# Patient Record
Sex: MALE | Race: WHITE
[De-identification: names, ages, dates, MRNs, and addresses within clinical notes are randomized per-mention and may not be internally consistent; named-entity substitution may affect disease eponyms.]

---

## 2018-06-20 ENCOUNTER — HOSPITAL ENCOUNTER (EMERGENCY)
Dept: HOSPITAL 47 - EC | Age: 58
Discharge: HOME | End: 2018-06-20
Payer: SELF-PAY

## 2018-06-20 VITALS — DIASTOLIC BLOOD PRESSURE: 67 MMHG | HEART RATE: 60 BPM | SYSTOLIC BLOOD PRESSURE: 116 MMHG

## 2018-06-20 VITALS — RESPIRATION RATE: 18 BRPM | TEMPERATURE: 98 F

## 2018-06-20 DIAGNOSIS — Z88.5: ICD-10-CM
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
## 2018-06-20 DIAGNOSIS — S43.004A: Primary | ICD-10-CM

## 2018-06-20 PROCEDURE — 99283 EMERGENCY DEPT VISIT LOW MDM: CPT

## 2018-06-20 PROCEDURE — 99152 MOD SED SAME PHYS/QHP 5/>YRS: CPT

## 2018-06-20 PROCEDURE — 23650 CLTX SHO DSLC W/MNPJ WO ANES: CPT

## 2018-06-20 NOTE — ED
General Adult HPI





- General


Source: patient, RN notes reviewed


Mode of arrival: wheelchair


Limitations: no limitations





<Brown Cox - Last Filed: 06/20/18 18:37>





<Milton Hicks - Last Filed: 06/20/18 19:17>





- General


Chief complaint: Extremity Injury, Upper


Stated complaint: Shoulder Pain


Time Seen by Provider: 06/20/18 16:52





- History of Present Illness


Initial comments: 





Patient 57-year-old male presents emergency room today with a chief complaint 

of fall that occurred approximately an hour ago.  Patient does admit that he 

was on a roof all spelled fell down onto a platform.  States that shoulder.  

States he believes his right shoulder is dislocated.  Patient states has pain 

with any type of movement of the shoulder area.  He denies any head injury lost 

consciousness.  States is not on any blood thinners.  He denies any other 

complaints. Patient denies any recent fever, chills, shortness of breath, chest 

pain, back pain, abdominal pain, nausea or vomiting, numbness or tingling, 

headaches or visual changes, or any other complaints. (Brown Cox)





- Related Data


 Home Medications











 Medication  Instructions  Recorded  Confirmed


 


Ibuprofen [Motrin Ib] 400 mg PO Q6H PRN 06/20/18 06/20/18











 Allergies











Allergy/AdvReac Type Severity Reaction Status Date / Time


 


codeine Allergy  Unknown Verified 06/20/18 17:32





[From Tylenol-Codeine #3]     














Review of Systems


ROS Other: All systems not noted in ROS Statement are negative.





<Brown Cox - Last Filed: 06/20/18 18:37>


ROS Other: All systems not noted in ROS Statement are negative.





<Milton Hicks - Last Filed: 06/20/18 19:17>


ROS Statement: 


Those systems with pertinent positive or pertinent negative responses have been 

documented in the HPI.








Past Medical History


Past Medical History: No Reported History


History of Any Multi-Drug Resistant Organisms: None Reported


Past Surgical History: No Surgical Hx Reported


Past Psychological History: No Psychological Hx Reported


Smoking Status: Never smoker


Past Alcohol Use History: Occasional


Past Drug Use History: None Reported





<Brown Cox - Last Filed: 06/20/18 18:37>





General Exam


Limitations: no limitations





<Brown Cox - Last Filed: 06/20/18 18:37>





<Milton Hicks - Last Filed: 06/20/18 19:17>





- General Exam Comments


Initial Comments: 





General:  The patient is awake and alert, in no distress, and does not appear 

acutely ill.   


Neck:  The neck is supple, there is no tenderness or JVD.  


Musculoskeletal: Patient does have abnormal of the right shoulder.  Patient 

shows limited range of motion at this time.  Good range motion of the right 

wrist with flexion and extension.  Radial pulses 2+.  Sensations intact.  No 

tenderness in cervical thoracic or lumbar spine.


Neurological:  A&O x 3. CN II-XII intact, There are no obvious motor or sensory 

deficits. Coordination appears grossly intact. Speech is normal.


Skin:  Skin is warm and dry and no rashes or lesions are noted. 


Psychiatric:  Normal mood and affect.   (Brown Cox)





 Vital Signs











  06/20/18 06/20/18 06/20/18





  16:57 17:40 17:52


 


Temperature 98.0 F  


 


Pulse Rate 61 58 L 62


 


Respiratory 18 18 18





Rate   


 


Blood Pressure 133/84 118/75 123/64


 


O2 Sat by Pulse 100 99 100





Oximetry   














  06/20/18 06/20/18 06/20/18





  17:55 17:58 18:01


 


Temperature   


 


Pulse Rate 51 L 51 L 52 L


 


Respiratory 18 18 18





Rate   


 


Blood Pressure 107/60 115/72 115/72


 


O2 Sat by Pulse 98 97 97





Oximetry   














  06/20/18





  18:04


 


Temperature 


 


Pulse Rate 60


 


Respiratory 18





Rate 


 


Blood Pressure 116/67


 


O2 Sat by Pulse 99





Oximetry 














Medical Decision Making





<Brown Cox - Last Filed: 06/20/18 18:37>





<Milton Hicks - Last Filed: 06/20/18 19:17>





- Medical Decision Making





Patient x-ray did show an anterior dislocation of the right shoulder.  Patient 

was conscious sedated with attending physician Dr. Hicks.  Patient's right 

shoulder was reduced.  Repeat x-ray shows good reduction.  Patient doing well 

at this time.  Will be discharged home and advised to follow-up with 

orthopedics. (Brown Cox)





The patient was seen and examined.  All diagnostics were reviewed.  The case is 

discussed with the PA and I agree with the findings as documented.  The patient 

requested procedural sedation for shoulder reduction.  Risks benefits are 

discussed.  Verbal and written consent is obtained.  He did receive a total of 

11 mg of etomidate.  Excellent sedation is obtained.  Patient was watched on a 

cardiorespiratory monitor throughout.  The patient did not have any 

complications.  The sedation lasted for a total of 12 minutes.  He did wake up 

and was in no distress with excellent reduction of his shoulder.  He was 

counseled regarding his diagnosis in detail and left in no distress.  Post 

sedation restrictions are discussed as well. (Milton Hicks)





Disposition


Is patient prescribed a controlled substance at d/c from ED?: No


Time of Disposition: 18:41





<Brown Cox - Last Filed: 06/20/18 18:37>


Is patient prescribed a controlled substance at d/c from ED?: No





<Milton Hicks - Last Filed: 06/20/18 19:17>


Clinical Impression: 


 Shoulder dislocation





Disposition: HOME SELF-CARE


Condition: Good


Instructions:  Shoulder Dislocation (ED)


Additional Instructions: 


Please use arm sling when up and moving around and follow-up with orthopedics 

over the next 2 days.  Please return to the emergency room for any other 

concerns


Referrals: 


None,Stated [Primary Care Provider] - 1-2 days


Honorio Milan MD [STAFF PHYSICIAN] - 1-2 days

## 2018-06-20 NOTE — XR
EXAMINATION TYPE: XR shoulder complete RT

 

DATE OF EXAM: 6/20/2018

 

COMPARISON: NONE

 

HISTORY: Pain

 

TECHNIQUE: 2 views

 

FINDINGS: There is anterior dislocation of the humeral head. I see no fracture.

 

IMPRESSION: Dislocated shoulder joint.

## 2018-06-20 NOTE — XR
EXAMINATION TYPE: XR shoulder limited RT

 

DATE OF EXAM: 6/20/2018

 

COMPARISON: Today

 

HISTORY: Post reduction

 

Findings

 

A single view shows apparent anatomic reduction of the glenohumeral joint. I see no fracture line.

 

IMPRESSION: Anatomic reduction.

## 2022-04-15 ENCOUNTER — HOSPITAL ENCOUNTER (OUTPATIENT)
Dept: HOSPITAL 47 - RADCTMAIN | Age: 62
Discharge: HOME | End: 2022-04-15
Attending: FAMILY MEDICINE
Payer: COMMERCIAL

## 2022-04-15 ENCOUNTER — HOSPITAL ENCOUNTER (OUTPATIENT)
Dept: HOSPITAL 47 - RADMRIMAIN | Age: 62
Discharge: HOME | End: 2022-04-15
Attending: FAMILY MEDICINE
Payer: COMMERCIAL

## 2022-04-15 DIAGNOSIS — C73: Primary | ICD-10-CM

## 2022-04-15 DIAGNOSIS — R91.1: ICD-10-CM

## 2022-04-15 DIAGNOSIS — E89.0: ICD-10-CM

## 2022-04-15 DIAGNOSIS — C77.9: ICD-10-CM

## 2022-04-15 PROCEDURE — 71250 CT THORAX DX C-: CPT

## 2022-04-15 PROCEDURE — 70543 MRI ORBT/FAC/NCK W/O &W/DYE: CPT

## 2022-04-16 NOTE — CT
EXAMINATION TYPE: CT chest wo con

 

DATE OF EXAM: 4/15/2022

 

COMPARISON:

 

HISTORY: Hx thyroid ca, observe for mets

 

CT DLP: 409 mGycm

 

Unenhanced CT of the chest was performed with lung and mediastinal window settings submitted.  The la
ck of contrast limits evaluation of the vascular, mediastinal and parenchymal structures including th
e upper abdomen.

 

LUNGS: The lungs are clear and free of infiltrate. Linear basilar parenchymal scarring or atelectasis
. Mild upper lobe emphysematous change. 2 mm nodular density superior segment right lower lobe image 
30 of 68. No pleural effusion.  No CT evidence of interstitial lung disease.

 

MEDIASTINUM/JESS:  Thoracic aorta is of normal caliber with limited evaluation given lack of contrast
.  The heart is not enlarged.  No evidence for mediastinal mass.  No  lymph nodes greater than 1cm.

 

UPPER ABDOMEN: Simple cyst right hepatic lobe anterior segment measuring 1.7 cm.

 

OTHER: No significant other abnormality.

 

IMPRESSION:

 

1.  No convincing evidence for metastatic disease to the chest. Small 2 mm pulmonary nodule as discus
sed. Consider follow-up study in one year.

## 2022-04-16 NOTE — MR
EXAMINATION TYPE: MR neck wo/w con

 

DATE OF EXAM: 4/15/2022

 

COMPARISON: None

 

HISTORY: Follow-up to thyroidectomy 2019, no new symptoms.

 

CONTRAST: 

Standard multiplanar, multisequence MRI departmental protocol images were obtained without contrast a
nd with 7 mL intravenous Gadavist gadolinium contrast.  

The visualized brainstem is intact. No evidence of posterior fossa mass. Internal auditory canals ariadna
ear normal. The tonsils and adenoids appear normal. Cervical spine is intact. No spinal stenosis. Epi
glottis is normal. The trachea appears intact. The parotid glands are intact. Submandibular salivary 
glands appear small. Epiglottis is normal. The tongue appears intact. There is no evidence of pharyng
eal mass. Cervical esophagus appears intact. Thyroid gland appears absent. No cervical adenopathy.

Superior mediastinum appears intact. No adenopathy.

Contrast images show no pathologic enhancement.

 

IMPRESSION:

Thyroid gland appears absent. No discrete neck mass.

## 2022-08-05 ENCOUNTER — HOSPITAL ENCOUNTER (OUTPATIENT)
Dept: HOSPITAL 47 - RADPETMAIN | Age: 62
Discharge: HOME | End: 2022-08-05
Attending: PHYSICIAN ASSISTANT
Payer: COMMERCIAL

## 2022-08-05 DIAGNOSIS — C77.9: ICD-10-CM

## 2022-08-05 DIAGNOSIS — C73: Primary | ICD-10-CM

## 2022-08-05 PROCEDURE — 78815 PET IMAGE W/CT SKULL-THIGH: CPT

## 2022-08-08 NOTE — PE
EXAMINATION TYPE: PET CT fusion skull to thigh

 

DATE OF EXAM: 8/5/2022

 

CLINICAL INDICATION:Male, 62 years old with history of C73 Malignant neoplasm of thyroid, C77.9 secon
ayden;

 

TECHNIQUE:  Following the intravenous administration of  11.66 mCi of F-18 FDG, whole body images are
 performed from the skull base to the midthigh.  Images are reviewed on the computer in the coronal, 
axial, and sagittal planes.  Reconstructed rotating images are created on independent workstation and
 reviewed on the computer.   A non-contrast CT is performed in conjunction with the PET scan. Glucose
 level 98 mg/dL

 

COMPARISON: CT 4/15/2022., PET/CT None., MR neck 4/15/2022.

 

FINDINGS: 

SKULL BASE AND NECK:

Soft tissue mass with Max SUV 19.42 involving the right supraclavicular region and extending into the
 right C7-T1 neural foramen measures approximately 1.7 x 1.6 cm in the axial plane and is more elonga
john in the caudocranial dimension. Not definitely seen on prior MR neck 4/15/2022.

 

CHEST, MEDIASTINUM, AND HILAR REGION: Background mediastinal mean SUV 1.0, maximum 1.4.

Right low paratracheal lymph node max SUV 2.2 and 5 mm in short axis.

Posterior to the esophagus is a lymph node max SUV 2.5 and measuring 10 mm.

Left 10L lymph node max SUV 2.6 measuring 11 mm in short axis

Left lower lobe mass with SUV 5.9 measuring 3.1 x 3.1 cm.

 

ABDOMEN AND PELVIS: Focus of radiotracer uptake in the sigmoid colon measuring Max SUV 7.3. No additi
onal areas of suspicious FDG uptake.

 

OSSEOUS STRUCTURES: No suspicious FDG activity.

 

OTHER CT: Atherosclerosis of the arterial vasculature. Mild gynecomastia changes on the left. Mild ce
ntrilobular emphysema changes are present. Posterior dependent atelectasis/streaky scarring are prese
nt. Mild pectus excavatum changes. Hypodense hepatic cyst is present. Left adrenal nodule consistent 
with benign lipid rich effusion adrenal adenoma. Colonic diverticulosis. Prostate gland is enlarged m
easuring up to 6.1 cm in transverse dimension. Vasectomy clips are present. Multilevel disc degenerat
ion changes are seen throughout the spine.

 

IMPRESSION: 

1.  Constellation of findings concerning for metastatic disease including a new left lower lobe perih
ilar mass with associated new mediastinal lymphadenopathy, these were not seen on CT thorax 4/15/2022
. Additionally, a right lower neck soft tissue mass with FDG activity extends near the right C7-T1 ne
ural foramen. This is felt to have been present on MRI Neck 4/15/2022 it may represent a additional f
ocus of metastatic disease versus possibly a nerve sheath tumor. Dedicated C-spine MRI with and witho
ut IV contrast with fat saturation and T1-weighted sequences is recommended for further evaluation of
 this right C7-T1 neural foramen focus of FDG activity.

2.  Nonspecific focus of radiotracer uptake within the sigmoid colon, if not recently performed a col
onoscopy is recommended to rule out colon cancer.

## 2022-08-10 ENCOUNTER — HOSPITAL ENCOUNTER (OUTPATIENT)
Dept: HOSPITAL 47 - RADMRIMAIN | Age: 62
Discharge: HOME | End: 2022-08-10
Attending: INTERNAL MEDICINE
Payer: COMMERCIAL

## 2022-08-10 DIAGNOSIS — C73: Primary | ICD-10-CM

## 2022-08-10 PROCEDURE — 70543 MRI ORBT/FAC/NCK W/O &W/DYE: CPT

## 2022-08-11 NOTE — MR
EXAMINATION TYPE: MR neck wo/w con

 

DATE OF EXAM: 8/10/2022

 

COMPARISON: 4/15/2022

 

HISTORY: Thyroid Ca, abnormal PET CT

 

CONTRAST: 

Standard multiplanar, multisequence MRI departmental protocol images were obtained without contrast a
nd with 6.5 mL intravenous Gadavist gadolinium contrast.  

 

 

There is oval-shaped 3.3 x 1.6 cm mass on the right side of the lower cervical spine at the C7-T1 lev
el anteriorly. This has isointensity on the T1 images and is very weakly enhancing. This likely a lym
ph node and is not changed in size compared to the previous MR scan. This appears posterior to the pr
oximal right vertebral artery.

 

The trachea appears intact. No evidence of pharyngeal mass. The tongue is intact. Tonsils are intact.
 The parotid glands are symmetric. No evidence of submandibular salivary gland mass.

 

The cervical vertebra appear intact. No compression fracture. No evidence 

of cervical spinal stenosis. Thyroid gland is absent.

 

IMPRESSION:

Right-sided oval-shaped mass has benign features and also present on previous exam and not significan
tly different. This probably a lymph node.

## 2022-08-25 ENCOUNTER — HOSPITAL ENCOUNTER (OUTPATIENT)
Dept: HOSPITAL 47 - ORWHC2ENDO | Age: 62
Discharge: HOME | End: 2022-08-25
Attending: INTERNAL MEDICINE
Payer: COMMERCIAL

## 2022-08-25 VITALS — RESPIRATION RATE: 18 BRPM

## 2022-08-25 VITALS — DIASTOLIC BLOOD PRESSURE: 61 MMHG | HEART RATE: 75 BPM | SYSTOLIC BLOOD PRESSURE: 107 MMHG

## 2022-08-25 VITALS — BODY MASS INDEX: 20.7 KG/M2

## 2022-08-25 VITALS — TEMPERATURE: 97.1 F

## 2022-08-25 DIAGNOSIS — Z87.891: ICD-10-CM

## 2022-08-25 DIAGNOSIS — Z98.890: ICD-10-CM

## 2022-08-25 DIAGNOSIS — Z88.5: ICD-10-CM

## 2022-08-25 DIAGNOSIS — Z80.3: ICD-10-CM

## 2022-08-25 DIAGNOSIS — Z79.890: ICD-10-CM

## 2022-08-25 DIAGNOSIS — Z79.899: ICD-10-CM

## 2022-08-25 DIAGNOSIS — C34.32: Primary | ICD-10-CM

## 2022-08-25 DIAGNOSIS — Z90.89: ICD-10-CM

## 2022-08-25 DIAGNOSIS — E07.9: ICD-10-CM

## 2022-08-25 DIAGNOSIS — Z85.850: ICD-10-CM

## 2022-08-25 PROCEDURE — 88342 IMHCHEM/IMCYTCHM 1ST ANTB: CPT

## 2022-08-25 PROCEDURE — 87102 FUNGUS ISOLATION CULTURE: CPT

## 2022-08-25 PROCEDURE — 87206 SMEAR FLUORESCENT/ACID STAI: CPT

## 2022-08-25 PROCEDURE — 31652 BRONCH EBUS SAMPLNG 1/2 NODE: CPT

## 2022-08-25 PROCEDURE — 87070 CULTURE OTHR SPECIMN AEROBIC: CPT

## 2022-08-25 PROCEDURE — 88104 CYTOPATH FL NONGYN SMEARS: CPT

## 2022-08-25 PROCEDURE — 71250 CT THORAX DX C-: CPT

## 2022-08-25 PROCEDURE — 88305 TISSUE EXAM BY PATHOLOGIST: CPT

## 2022-08-25 PROCEDURE — 88173 CYTOPATH EVAL FNA REPORT: CPT

## 2022-08-25 PROCEDURE — 88341 IMHCHEM/IMCYTCHM EA ADD ANTB: CPT

## 2022-08-25 PROCEDURE — 31625 BRONCHOSCOPY W/BIOPSY(S): CPT

## 2022-08-25 PROCEDURE — 88108 CYTOPATH CONCENTRATE TECH: CPT

## 2022-08-25 PROCEDURE — 87116 MYCOBACTERIA CULTURE: CPT

## 2022-08-25 PROCEDURE — 31627 NAVIGATIONAL BRONCHOSCOPY: CPT

## 2022-08-25 PROCEDURE — 31624 DX BRONCHOSCOPE/LAVAGE: CPT

## 2022-08-25 PROCEDURE — 87205 SMEAR GRAM STAIN: CPT

## 2022-08-25 PROCEDURE — 31623 DX BRONCHOSCOPE/BRUSH: CPT

## 2022-08-25 NOTE — CT
EXAMINATION TYPE: CT Chest wo con [facility] Protocol

 

DATE OF EXAM: 8/25/2022

 

COMPARISON: Chest CT April 15, 2022. PET/CT August 5, 2022

 

HISTORY: pre-op bronch

 

CT DLP: 651 mGycm

Automated exposure control for dose reduction was used.

 

FINDINGS: 

Exam is for bronchoscopy planning and not for diagnostic purposes. There is mild-to-moderate underlyi
ng emphysematous change redemonstrated. There is redemonstration of posterior left hilar mass or neop
lasm seen on series 6 image 34 corresponding to most recent PET/CT. Suspicious lymph node in the AP w
indow axial image 26 and 27 series 5 is redemonstrated. There is mild to moderate bibasilar linear sc
arring and/or atelectasis posteriorly redemonstrated.

 

IMPRESSION: As above.

## 2022-08-25 NOTE — P.PCN
Date of Procedure: 08/25/22


Preoperative Diagnosis: 





Left hilar mass








Postoperative Diagnosis: 


 


  1 left infrahilar mass


  2 Narrowing of the left lower lobe bronchus secondary to endobronchial tumor 

and extrinsic compression and narrowing








Procedure(s) Performed: 





 


 1 Navigational bronchoscopy ([facility]) 


 2 endobronchial biopsy, brushing, and washing of the left lower lobe mass


 3 EBUS-endobronchial ultrasound


 4 EBUS  guided TTNA of left infrahilar mass








Anesthesia: GETA


Surgeon: Dylan Cason


Assistant #1: Joyce Muhammad


Estimated Blood Loss (ml): 0


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


Condition: stable


Disposition: same day


Operative Findings: 





This procedure was done under general anesthesia.  There is a navigational 

bronchoscopy.  The patient initially had a CAT scan of the chest utilizing the 

[employer] protocol.  The images were uploaded into the software and the  left lower 

lobe  mass was identified and appropriate calibrations and mapping was done.  

Following that, all of this information was uploaded into the [employer] navigational

tower.





The patient was brought into the endoscopy suite.  The patient was intubated by 

CRNA.  The patient had a  8  ET tube placed and she was secured in place.  

Following that, flexible bronchoscope was introduced through the orotracheal 

tube and an airway inspection was done.  The visualized airways included the mid

and the distal trachea, bilateral mainstem bronchi, right upper lobe bronchus, 

bronchus intermedius, right middle lobe and right lower lobe bronchus and the 

various 10 segments on the right.  Bronchoscope was moved to the left and the 

visualized airways included the left mainstem bronchus was within normal limits,

left upper lobe bronchus was patent and within normal limits and left lower lobe

bronchus was significantly narrowed due to endobronchial tumor affecting its 

posterior wall and extrinsic compression.  Left lower lobe bronchus was down by 

at least 60% of his normal caliber.





Using navigational guidance, the bronchoscope was directed to the right upper 

lobe.     Using a navigational forceps, endobronchial biopsies  of the left 

lower lobe mass/endobronchial extension of the tumor into the left lower lobe 

bronchus was identified and biopsied.  Several biopsies were obtained.  

Following that, endobronchial brushings of the  the same lesion was done using 

navigational guidance.     Following that, the bronchioloalveolar lavage of the 

left lower lobe bronchus was done.  A total of 80 disease of fluid was infused 

and approximately 20 mL of aspirate was obtained.





The flexible bronchoscope was removed.  The EBUS bronchoscope was used and the 

mediastinal lymph nodes were essentially small.  Following that, the EBUS in the

records of the left lower lobe bronchus and the left infrahilar mass was 

identified and direct measurement of the mass was in the order of 2.8 x 2.4 cm 

in size.  Under EBUS guidance, transbronchial needle aspirate of the left lower 

lobe mass was done.  Adequacy of the samples was confirmed by pathology the 

bedside.  A total of 5 passes were obtained.  Majority of the samples were also 

sent into cell blocks.  After completion of the procedure, the EBUS bronchoscope

was removed. A flexible bronchoscope was introduced and therapeutic it was braxton

ctioning was done.  There was no evidence of an endobronchial bleed.  

Bronchoscope was removed and the patient was extubated chest at the recovery in 

stable condition.  No bedside complications.  Patient will be discharged home 

after cleared by anesthesia.

## 2022-09-03 ENCOUNTER — HOSPITAL ENCOUNTER (OUTPATIENT)
Dept: HOSPITAL 47 - RADMRIMAIN | Age: 62
Discharge: HOME | End: 2022-09-03
Attending: INTERNAL MEDICINE
Payer: COMMERCIAL

## 2022-09-03 DIAGNOSIS — C73: Primary | ICD-10-CM

## 2022-09-03 DIAGNOSIS — C34.90: ICD-10-CM

## 2022-09-03 PROCEDURE — 70553 MRI BRAIN STEM W/O & W/DYE: CPT

## 2022-09-03 NOTE — MR
EXAMINATION TYPE: MR brain wo/w con

 

DATE OF EXAM: 9/3/2022

 

COMPARISON: MR neck 8/10/2022, PET/CT 8/5/2022

 

HISTORY: Thyroid and lung cancer, evaluate for metastatic disease.

 

TECHNIQUE: 

Multiplanar, multisequence images of the brain and brainstem is performed without and with IV contras
t, utilizing 6 mL intravenous Gadavist .

 

FINDINGS: Diffusion weighted images demonstrate no evidence of a recent infarct or other diffusion ab
normality.  There is no extra-axial fluid collection or significant white matter signal abnormality. 
 The ventricular system and cisternal spaces are normal in size and appearance.  The brain volume is 
age appropriate.

 

Midline structures demonstrate normal morphology.  The craniocervical junction appears within normal 
limits.  Post contrast images demonstrate no abnormal enhancement. The dural venous sinuses appear pa
tent. The visualized sinuses are clear and the globes are intact.

 

IMPRESSION: 

No evidence of metastatic disease or acute ischemia/mass effect.

## 2022-09-26 ENCOUNTER — HOSPITAL ENCOUNTER (OUTPATIENT)
Dept: HOSPITAL 47 - RADCTMAIN | Age: 62
Discharge: HOME | End: 2022-09-26
Attending: INTERNAL MEDICINE
Payer: COMMERCIAL

## 2022-09-26 DIAGNOSIS — C73: Primary | ICD-10-CM

## 2022-09-26 PROCEDURE — 70491 CT SOFT TISSUE NECK W/DYE: CPT

## 2022-09-26 PROCEDURE — 71260 CT THORAX DX C+: CPT

## 2022-09-26 NOTE — CT
EXAMINATION TYPE: CT neck chest w con

 

DATE OF EXAM: 9/26/2022 10:54 AM

 

COMPARISON: PET CT August 5, 2022

 

HISTORY: f/u thyroid and lung ca

 

CT DLP: 969 mGycm

Automated exposure control for dose reduction was used.

 

CONTRAST: 

CT scan of the neck and thorax are performed following with IV Contrast, patient injected with 100 mL
 of Isovue 300.  Axial images are obtained, coronal and sagittal reformatted images are reviewed.

 

FINDINGS:

 

Neck: 

 

Extensive right-sided surgical changes are redemonstrated with numerous clips. Right submandibular gl
and has been resected similar to prior. Persistent asymmetric ovoid right mass anterior to the right 
C7 transverse process measuring 1.8 x 1.6 x 2.7 cm corresponding to the hypermetabolic lesion on PET 
CT on axial image 64 and coronal image 65 encasing portion of the right vertebral artery axial images
 58 through 63.

 

No new masses or adenopathy clearly seen.

 

Nasal septum remains deviated to the left of midline.

 

Thorax: 

 

LUNGS: Moderate underlying emphysematous changes are redemonstrated. No pleural effusion or pneumotho
rax seen bilaterally.

 

MEDIASTINUM: There is persistent irregular 4.1 x 4.0 cm low dense mass possible left hilar adenopathy
 axial image 38 appear similar to most recent PET/CT. Subcentimeter lymph node superior to this in th
e AP window is less well seen and there are axial image 32.There is enlarging round 2.0 x 1.8 cm hype
rdense mass effect image 25 posterior to the esophagus causing anterior displacement.

 

OTHER: Subcentimeter low-density lesion left hepatic lobe axial image 61 redemonstrated presumed rye
gn thin-walled cyst. Additional simple appearing thin-walled cyst right hepatic lobe axial image 72 i
s noted. There is S-shaped scoliosis with mild/moderate multilevel spurring in the spine.

 

IMPRESSION: Stable in size hypermetabolic lesion right lower neck along with stable in size hypermeta
bolic posterior left hilar lesion both worrisome for neoplasm given PET/CT appearance. Enlarging medi
astinal lesion just above the karen posterior to the esophagus corresponding to hypermetabolic mass 
or neoplasm on PET CT is more suspicious given increased size. Active neoplasm felt present.

 

 

 

IMPRESSION:

## 2022-12-27 ENCOUNTER — HOSPITAL ENCOUNTER (OUTPATIENT)
Dept: HOSPITAL 47 - RADMRIMAIN | Age: 62
Discharge: HOME | End: 2022-12-27
Attending: INTERNAL MEDICINE
Payer: COMMERCIAL

## 2022-12-27 DIAGNOSIS — C34.90: Primary | ICD-10-CM

## 2022-12-27 DIAGNOSIS — C73: ICD-10-CM

## 2022-12-27 PROCEDURE — 70553 MRI BRAIN STEM W/O & W/DYE: CPT

## 2022-12-27 NOTE — MR
EXAMINATION TYPE: MR brain wo/w con

 

DATE OF EXAM: 12/27/2022

 

COMPARISON: MRI brain 9/3/2022

 

HISTORY: Lung and thyroid cancer follow up

 

TECHNIQUE: 

Multiplanar, multisequence images of the brain and brainstem is performed without and with IV contras
t, utilizing 6.5 mL intravenous Gadavist .

 

FINDINGS: Diffusion weighted images demonstrate no evidence of a recent infarct or other diffusion ab
normality.  There is no extra-axial fluid collection or significant white matter signal abnormality. 
 The ventricular system and cisternal spaces are normal in size and appearance.  The brain volume is 
age appropriate.

 

Midline structures demonstrate normal morphology.  The craniocervical junction appears within normal 
limits.  Post contrast images demonstrate no abnormal enhancement. The dural venous sinuses appear pa
tent. The visualized sinuses are clear and the globes are intact.

 

IMPRESSION: 

No evidence metastatic disease or acute ischemia/mass effect. No significant change from prior examin
ation.

## 2022-12-30 ENCOUNTER — HOSPITAL ENCOUNTER (OUTPATIENT)
Dept: HOSPITAL 47 - RADPETMAIN | Age: 62
Discharge: HOME | End: 2022-12-30
Attending: INTERNAL MEDICINE
Payer: COMMERCIAL

## 2022-12-30 DIAGNOSIS — R22.1: ICD-10-CM

## 2022-12-30 DIAGNOSIS — C73: Primary | ICD-10-CM

## 2022-12-30 DIAGNOSIS — K76.89: ICD-10-CM

## 2022-12-30 PROCEDURE — 78815 PET IMAGE W/CT SKULL-THIGH: CPT

## 2023-01-01 NOTE — PE
EXAMINATION TYPE: PET CT fusion skull to thigh

 

DATE OF EXAM: 12/30/2022

 

CLINICAL INDICATION:Male, 62 years old with history of C73;

 

TECHNIQUE:  Following the intravenous administration of  11.81 mCi of F-18 FDG, whole body images are
 performed from the skull base to the midthigh.  Images are reviewed on the computer in the coronal, 
axial, and sagittal planes.  Reconstructed rotating images are created on independent workstation and
 reviewed on the computer.   A non-contrast CT is performed in conjunction with the PET scan. Glucose
 level 93 mg/dL

 

COMPARISON: PET/CT 8/5/2022, MRI Neck 8/10/2022, 4/15/2022, CT neck 9/26/2022

 

FINDINGS: 

Mediastinal SUV mean is 1.5. Hepatic parenchyma SUV mean is 2.2. 

 

SKULL BASE AND NECK:

*  Soft tissue mass max SUV 19.91, previously Max SUV 19.42 involving the right supraclavicular regio
n and extending into the right C7-T1 neural foramen measures approximately 3.1 x 2.2 cm in the corona
l imaging plane which appears larger compared to prior, previously 2.7 x 1.8 cm.

 

CHEST, MEDIASTINUM, AND HILAR REGION: 

*  Right low paratracheal lymph node max SUV 2.8 and 4 mm, previously max SUV 2.4 and 5 mm  

*  Decrease in FDG activity within the posterior esophageal lymph node max SUV 3.5 measuring 8 mm, pr
eviously max SUV 2.5 and measuring 10 mm.

*  Resolution of prior Left 10L lymph node max SUV 2.6 measuring 11 mm in short axis

*  Near complete resolution of Left lower lobe mass without suspicious masses patient's activity, pre
viously SUV 5.9 measuring 3.1 x 3.1 cm.

 

ABDOMEN AND PELVIS: 

*  No additional areas of suspicious FDG uptake.

*  Resolution of Focus of radiotracer uptake in the sigmoid colon previously Max SUV 7.3. 

*  Somewhat patchy and irregular shaped uptake within the right hepatic lobe is more pronounced on to
day's exam.

 

OSSEOUS STRUCTURES: No suspicious FDG activity.

 

OTHER CT: Atherosclerosis of the arterial vasculature. Mild gynecomastia changes on the left. Mild ce
ntrilobular emphysema changes are present. Posterior dependent atelectasis/streaky scarring are prese
nt. Mild pectus excavatum changes. Hypodense hepatic cyst is present. Left adrenal nodule consistent 
with benign lipid rich effusion adrenal adenoma. Colonic diverticulosis. Prostate gland is enlarged m
easuring up to 6.1 cm in transverse dimension. Vasectomy clips are present. Multilevel disc degenerat
ion changes are seen throughout the spine.

 

IMPRESSION: 

1.  Positive response to therapy with near complete resolution of left lower lobe perihilar mass and 
associated mediastinal lymphadenopathy. 

2.  Right lower neck soft tissue mass with similar FDG activity extends near the right C7-T1 neural f
oramen. This appears mildly increased in size based on radiotracer uptake area on coronal imaging. 

 

3.  Patchy irregular shaped uptake within the liver is more conspicuous on today's exam is in the rig
ht hepatic lobe. Attention follow-up imaging.

4. Resolution of focus of radiotracer uptake within the sigmoid colon.